# Patient Record
(demographics unavailable — no encounter records)

---

## 2024-11-04 NOTE — LETTER BODY
[FreeTextEntry1] : Jose Verma MD [address] [place] [id number]  [phone]  Dear Dr. Verma,  Reason for Visit: BPH s/p Greenlight laser vaporization of prostate. Elevated PSA. Left ureteral stone s/p left ureteroscopy.    This is a 80 year-old gentleman with BPH, elevated PSA, and left ureteral stone. Patient previously underwent Greenlight laser vaporization of prostate with Dr. Gordon. Patient has also had 9 previous prostate biopsies and 2 prostate MRIs. At his previous [facility], his prostate volume was found to be 159 cc. His previous CT scan demonstrated a persistent left ureteral stone. Patient is status post left ureteroscopy, laser lithotripsy, stone extraction, and stent placement and left stent removal. Patient is here for follow-up and ultrasound. Ultrasound demonstrated no evidence obstructive renal calculi or hydronephrosis. Since his last visit, patient reports taking Proscar and Doxazosin regularly with no side effects or difficulties. He reports stable urinary symptoms with medication. Patient denies any gross hematuria or dysuria or urinary incontinence. The patient denies any aggravating or relieving factors. The patient denies any interference of function. The patient is entirely asymptomatic. All other review of systems are negative. He has no cancer in his family medical history. He has no previous surgical history. Past medical history, family history and social history were inquired and were noncontributory to current condition. The patient does not use tobacco or drink alcohol. Medications and allergies were reviewed. He has no known allergies to medication.    On examination, the patient is a healthy-appearing gentleman in no acute distress. He is alert and oriented follows commands. He has normal mood and affect. He is normocephalic. Oral no thrush. Neck is supple. Respirations are unlabored. His abdomen is soft and nontender. Liver is nonpalpable. Bladder is nonpalpable. No CVA tenderness. Neurologically he is grossly intact. No peripheral edema. Skin without gross abnormality.    I personally reviewed the ultrasound scan with patient today. Ultrasound demonstrated no evidence obstructive renal calculi or hydronephrosis.   Assessment: BPH s/p Greenlight laser vaporization of prostate. Elevated PSA. Left ureteral stone s/p left ureteroscopy.    I counseled the patient. In terms of his elevated PSA, he will follow-up in 6 months time for repeat PSA testing.  In terms of his left ureteral stone, the patient is status post left ureteroscopy. He denied any difficulties since his last visit. His ultrasound today was negative for obstructive renal calculi or hydronephrosis. I reassured the patient. I encouraged patient to maintain a low-protein low-sodium diet. I also encouraged hydration to prevent stone formation. He will follow-up in 6 month's time for renal ultrasound. I counseled the patient regarding the procedure. The risks and benefits were discussed. Alternatives were given. I answered the patient's questions. The patient will take the necessary preparations for the procedure. Thank you for the opportunity to participate in the care of this patient. I'll keep you updated on his progress.   Patient will continue longitudinal care for his complex and serious chronic condition.  Plan: Stone diet. Hydration. Follow-up in 6 months with renal ultrasound and repeat PSA testing.  I spent 30-minutes time today on all issues related to this patient on today date of service including non face to face time.

## 2024-11-04 NOTE — HISTORY OF PRESENT ILLNESS
[FreeTextEntry1] : Follow-up kidney stone. I personally reviewed the ultrasound scan with patient today. Ultrasound demonstrated no evidence obstructive renal calculi or hydronephrosis.  Stone diet.  Follow-up 6 months with renal ultrasound.    Follow-up with PSA.  Repeat PSA 6 months.   Please refer to URO Consult Note.

## 2024-11-04 NOTE — LETTER BODY
[FreeTextEntry1] : Jose Verma MD [address] [place] [id number]  [phone]  Dear Dr. Verma,  Reason for Visit: BPH s/p Greenlight laser vaporization of prostate. Elevated PSA. Left ureteral stone s/p left ureteroscopy.    This is a 80 year-old gentleman with BPH, elevated PSA, and left ureteral stone. Patient previously underwent Greenlight laser vaporization of prostate with Dr. Gordon. Patient has also had 9 previous prostate biopsies and 2 prostate MRIs. At his previous [facility], his prostate volume was found to be 159 cc. His previous CT scan demonstrated a persistent left ureteral stone. Patient is status post left ureteroscopy, laser lithotripsy, stone extraction, and stent placement and left stent removal. Patient is here for follow-up and ultrasound. Ultrasound demonstrated no evidence obstructive renal calculi or hydronephrosis. Since his last visit, patient reports taking Proscar and Doxazosin regularly with no side effects or difficulties. He reports stable urinary symptoms with medication. Patient denies any gross hematuria or dysuria or urinary incontinence. The patient denies any aggravating or relieving factors. The patient denies any interference of function. The patient is entirely asymptomatic. All other review of systems are negative. He has no cancer in his family medical history. He has no previous surgical history. Past medical history, family history and social history were inquired and were noncontributory to current condition. The patient does not use tobacco or drink alcohol. Medications and allergies were reviewed. He has no known allergies to medication.    On examination, the patient is a healthy-appearing gentleman in no acute distress. He is alert and oriented follows commands. He has normal mood and affect. He is normocephalic. Oral no thrush. Neck is supple. Respirations are unlabored. His abdomen is soft and nontender. Liver is nonpalpable. Bladder is nonpalpable. No CVA tenderness. Neurologically he is grossly intact. No peripheral edema. Skin without gross abnormality.    I personally reviewed the ultrasound scan with patient today. Ultrasound demonstrated no evidence obstructive renal calculi or hydronephrosis.   Assessment: BPH s/p Greenlight laser vaporization of prostate. Elevated PSA. Left ureteral stone s/p left ureteroscopy.    I counseled the patient. In terms of his elevated PSA, he will follow-up in 6 months time for repeat PSA testing.  In terms of his left ureteral stone, the patient is status post left ureteroscopy. He denied any difficulties since his last visit. His ultrasound today was negative for obstructive renal calculi or hydronephrosis. I reassured the patient. I encouraged patient to maintain a low-protein low-sodium diet. I also encouraged hydration to prevent stone formation. He will follow-up in 6 month's time for renal ultrasound. I counseled the patient regarding the procedure. The risks and benefits were discussed. Alternatives were given. I answered the patient's questions. The patient will take the necessary preparations for the procedure. Thank you for the opportunity to participate in the care of this patient. I'll keep you updated on his progress.   Patient will continue longitudinal care for his complex and serious chronic condition.  Plan: Stone diet. Hydration. Follow-up in 6 months with renal ultrasound and repeat PSA testing.  I spent 30-minutes time today on all issues related to this patient on today date of service including non face to face time.

## 2024-11-04 NOTE — ADDENDUM
[FreeTextEntry1] : Entered by Isidro Batista, acting as scribe for Dr. Rich Cole. The documentation recorded by the scribe accurately reflects the service I personally performed and the decisions made by me.

## 2025-05-05 NOTE — LETTER BODY
[FreeTextEntry1] : Jose Verma MD [address] [place] [id number]  [phone]  Dear Dr. Verma,  Reason for Visit: BPH s/p Greenlight laser vaporization of prostate. Elevated PSA. Left ureteral stone s/p left ureteroscopy.   This is a 81 year-old gentleman with BPH, elevated PSA, and left ureteral stone. Patient previously underwent Greenlight laser vaporization of prostate with Dr. Gordon. Patient has also had 9 previous prostate biopsies and 2 prostate MRIs. At his previous [facility], his prostate volume was found to be 159 cc. His previous CT scan demonstrated a persistent left ureteral stone. Patient is status post left ureteroscopy, laser lithotripsy, stone extraction, and stent placement and left stent removal. Ultrasound previously demonstrated no evidence obstructive renal calculi or hydronephrosis. Stone analysis demonstrated calcium oxalate stone. Patient is here for follow-up. Since his last visit, patient reports taking Proscar and Doxazosin regularly with no side effects or difficulties. He reports stable urinary symptoms with medication. Patient denies any gross hematuria or dysuria or urinary incontinence. The patient denies any aggravating or relieving factors. The patient denies any interference of function. The patient is entirely asymptomatic. All other review of systems are negative. He has no cancer in his family medical history. He has no previous surgical history. Past medical history, family history and social history were inquired and were noncontributory to current condition. The patient does not use tobacco or drink alcohol. Medications and allergies were reviewed. He has no known allergies to medication.    On examination, the patient is a healthy-appearing gentleman in no acute distress. He is alert and oriented follows commands. He has normal mood and affect. He is normocephalic. Oral no thrush. Neck is supple. Respirations are unlabored. His abdomen is soft and nontender. Liver is nonpalpable. Bladder is nonpalpable. No CVA tenderness. Neurologically he is grossly intact. No peripheral edema. Skin without gross abnormality.   Assessment: BPH s/p Greenlight laser vaporization of prostate. Elevated PSA. Left ureteral stone s/p left ureteroscopy.    I counseled the patient. In terms of his elevated PSA, I recommended the patient repeat PSA and BMP to ensure stability. He may consider prostate MRI and fusion biopsy if his PSA falls outside stable ranges. In terms of his left ureteral stone, the patient is status post left ureteroscopy. He denied any difficulties since his last visit. I recommended the patient repeat renal ultrasound to ensure stability. I counseled the patient regarding the procedure. The patient will take the necessary preparations for the procedure.  I encouraged patient to maintain a low-protein low-sodium diet. I also encouraged hydration to prevent stone formation.  The risks and benefits were discussed. Alternatives were given. I answered the patient's questions. The patient will take the necessary preparations for the procedure. Thank you for the opportunity to participate in the care of this patient. I'll keep you updated on his progress.   Patient will continue longitudinal care for his complex and serious chronic condition.  Plan: Renal ultrasound. PSA. BMP. Stone diet. Hydration. Follow-up in 1 year.  I spent 30-minutes time today on all issues related to this patient on today date of service including non face to face time.

## 2025-05-05 NOTE — HISTORY OF PRESENT ILLNESS
[FreeTextEntry1] : Follow left ureteral stone.  Stone analysis demonstrated calcium oxalate stone.  I encouraged patient to maintain a low-protein low-sodium diet. I also encouraged hydration to prevent stone formation.   Repeat ultrasound.  Follow-up with me.  Follow-up elevated PSA.  Reviewed PSA.  Consider MRI fusion biopsy.  Please refer to URO Consult Note.